# Patient Record
Sex: FEMALE | Race: WHITE | NOT HISPANIC OR LATINO | URBAN - METROPOLITAN AREA
[De-identification: names, ages, dates, MRNs, and addresses within clinical notes are randomized per-mention and may not be internally consistent; named-entity substitution may affect disease eponyms.]

---

## 2018-06-19 ENCOUNTER — OUTPATIENT (OUTPATIENT)
Dept: OUTPATIENT SERVICES | Facility: HOSPITAL | Age: 58
LOS: 1 days | End: 2018-06-19
Payer: COMMERCIAL

## 2018-06-19 VITALS
DIASTOLIC BLOOD PRESSURE: 70 MMHG | SYSTOLIC BLOOD PRESSURE: 122 MMHG | WEIGHT: 175.93 LBS | OXYGEN SATURATION: 98 % | HEIGHT: 60 IN | HEART RATE: 80 BPM | RESPIRATION RATE: 16 BRPM | TEMPERATURE: 99 F

## 2018-06-19 DIAGNOSIS — N84.0 POLYP OF CORPUS UTERI: ICD-10-CM

## 2018-06-19 LAB
ALBUMIN SERPL ELPH-MCNC: 4.2 G/DL — SIGNIFICANT CHANGE UP (ref 3.3–5)
ALP SERPL-CCNC: 95 U/L — SIGNIFICANT CHANGE UP (ref 40–120)
ALT FLD-CCNC: 20 U/L — SIGNIFICANT CHANGE UP (ref 4–33)
AST SERPL-CCNC: 17 U/L — SIGNIFICANT CHANGE UP (ref 4–32)
BILIRUB SERPL-MCNC: < 0.2 MG/DL — LOW (ref 0.2–1.2)
BLD GP AB SCN SERPL QL: NEGATIVE — SIGNIFICANT CHANGE UP
BUN SERPL-MCNC: 20 MG/DL — SIGNIFICANT CHANGE UP (ref 7–23)
CALCIUM SERPL-MCNC: 9.5 MG/DL — SIGNIFICANT CHANGE UP (ref 8.4–10.5)
CHLORIDE SERPL-SCNC: 100 MMOL/L — SIGNIFICANT CHANGE UP (ref 98–107)
CO2 SERPL-SCNC: 28 MMOL/L — SIGNIFICANT CHANGE UP (ref 22–31)
CREAT SERPL-MCNC: 0.71 MG/DL — SIGNIFICANT CHANGE UP (ref 0.5–1.3)
GLUCOSE SERPL-MCNC: 99 MG/DL — SIGNIFICANT CHANGE UP (ref 70–99)
HCT VFR BLD CALC: 42.8 % — SIGNIFICANT CHANGE UP (ref 34.5–45)
HGB BLD-MCNC: 13.4 G/DL — SIGNIFICANT CHANGE UP (ref 11.5–15.5)
MCHC RBC-ENTMCNC: 27 PG — SIGNIFICANT CHANGE UP (ref 27–34)
MCHC RBC-ENTMCNC: 31.3 % — LOW (ref 32–36)
MCV RBC AUTO: 86.3 FL — SIGNIFICANT CHANGE UP (ref 80–100)
NRBC # FLD: 0 — SIGNIFICANT CHANGE UP
PLATELET # BLD AUTO: 232 K/UL — SIGNIFICANT CHANGE UP (ref 150–400)
PMV BLD: 11.3 FL — SIGNIFICANT CHANGE UP (ref 7–13)
POTASSIUM SERPL-MCNC: 4.2 MMOL/L — SIGNIFICANT CHANGE UP (ref 3.5–5.3)
POTASSIUM SERPL-SCNC: 4.2 MMOL/L — SIGNIFICANT CHANGE UP (ref 3.5–5.3)
PROT SERPL-MCNC: 7.6 G/DL — SIGNIFICANT CHANGE UP (ref 6–8.3)
RBC # BLD: 4.96 M/UL — SIGNIFICANT CHANGE UP (ref 3.8–5.2)
RBC # FLD: 13.2 % — SIGNIFICANT CHANGE UP (ref 10.3–14.5)
RH IG SCN BLD-IMP: POSITIVE — SIGNIFICANT CHANGE UP
SODIUM SERPL-SCNC: 140 MMOL/L — SIGNIFICANT CHANGE UP (ref 135–145)
WBC # BLD: 11.09 K/UL — HIGH (ref 3.8–10.5)
WBC # FLD AUTO: 11.09 K/UL — HIGH (ref 3.8–10.5)

## 2018-06-19 PROCEDURE — 93010 ELECTROCARDIOGRAM REPORT: CPT

## 2018-06-19 NOTE — H&P PST ADULT - NEGATIVE ENMT SYMPTOMS
no nasal discharge/no sinus symptoms/no throat pain/no dysphagia/no hearing difficulty/no nasal congestion

## 2018-06-19 NOTE — H&P PST ADULT - NEGATIVE GENERAL GENITOURINARY SYMPTOMS
no dysuria/no bladder infections/no flank pain L/no flank pain R/normal urinary frequency/no renal colic

## 2018-06-19 NOTE — H&P PST ADULT - HISTORY OF PRESENT ILLNESS
57 y.o. female , LMP 2017, reports uterine polyp noted during routing GYN exam, denies pelvic pain, abnormal bleeding, presents to PST for evaluation for Hysteroscopy With Symphion Resection on 18

## 2018-06-19 NOTE — H&P PST ADULT - PROBLEM SELECTOR PLAN 1
Pt scheduled for Hysteroscopy with Symphion Resectoscopy on 06/27/18  Preop instructions provided. Pt verbalized understanding.   pt to take Nexium for GI prophylaxis

## 2018-06-19 NOTE — H&P PST ADULT - DENTITION
implants right upper x3, left upper x 3; bottom right x 3; frontal bottom x 1 multiple upper and lower dental implants, denies loose teeth

## 2018-06-19 NOTE — H&P PST ADULT - NEGATIVE MUSCULOSKELETAL SYMPTOMS
no myalgia/no muscle cramps/no neck pain/no back pain/no stiffness/no joint swelling/no muscle weakness/no arthritis

## 2018-06-19 NOTE — H&P PST ADULT - CONSTITUTIONAL DETAILS
well-groomed/no distress/well-developed/well-nourished well-nourished/well-developed/no distress/well-groomed/obese

## 2018-06-26 ENCOUNTER — TRANSCRIPTION ENCOUNTER (OUTPATIENT)
Age: 58
End: 2018-06-26

## 2018-06-27 ENCOUNTER — OUTPATIENT (OUTPATIENT)
Dept: OUTPATIENT SERVICES | Facility: HOSPITAL | Age: 58
LOS: 1 days | Discharge: HOME | End: 2018-06-27
Payer: COMMERCIAL

## 2018-06-27 ENCOUNTER — RESULT REVIEW (OUTPATIENT)
Age: 58
End: 2018-06-27

## 2018-06-27 VITALS
TEMPERATURE: 98 F | WEIGHT: 175.93 LBS | SYSTOLIC BLOOD PRESSURE: 133 MMHG | HEART RATE: 73 BPM | DIASTOLIC BLOOD PRESSURE: 60 MMHG | OXYGEN SATURATION: 96 % | HEIGHT: 60 IN | RESPIRATION RATE: 16 BRPM

## 2018-06-27 VITALS
DIASTOLIC BLOOD PRESSURE: 62 MMHG | SYSTOLIC BLOOD PRESSURE: 116 MMHG | RESPIRATION RATE: 16 BRPM | HEART RATE: 75 BPM | TEMPERATURE: 98 F | OXYGEN SATURATION: 95 %

## 2018-06-27 DIAGNOSIS — N84.0 POLYP OF CORPUS UTERI: ICD-10-CM

## 2018-06-27 PROCEDURE — 88305 TISSUE EXAM BY PATHOLOGIST: CPT | Mod: 26

## 2018-06-27 RX ORDER — ESOMEPRAZOLE MAGNESIUM 40 MG/1
1 CAPSULE, DELAYED RELEASE ORAL
Qty: 0 | Refills: 0 | COMMUNITY

## 2018-06-27 RX ORDER — IBUPROFEN 200 MG
3 TABLET ORAL
Qty: 0 | Refills: 0 | COMMUNITY

## 2018-06-27 RX ORDER — SODIUM CHLORIDE 9 MG/ML
1000 INJECTION, SOLUTION INTRAVENOUS
Qty: 0 | Refills: 0 | Status: DISCONTINUED | OUTPATIENT
Start: 2018-06-27 | End: 2018-06-28

## 2018-06-27 NOTE — ASU DISCHARGE PLAN (ADULT/PEDIATRIC). - NURSING INSTRUCTIONS
See medication reconciliation record   You were given Toradol for pain management. Please DO Not take Motrin/Ibuprofen (NSAIDS) for the next 6 hours (Until _8:30pm__).   You were given IV Tylenol for pain management.  Please DO NOT take tylenol for the next 6-8 hours (after 9pm ). Please do not exceed 3000mg in 24hours.

## 2018-06-27 NOTE — ASU DISCHARGE PLAN (ADULT/PEDIATRIC). - ACTIVITY LEVEL
no douching/no intercourse/no tampons/nothing per vagina/no tub baths no exercise/no sports/gym/no tampons/no heavy lifting/no douching/no intercourse/nothing per vagina/no tub baths

## 2018-06-27 NOTE — BRIEF OPERATIVE NOTE - PROCEDURE
<<-----Click on this checkbox to enter Procedure Polypectomy, hysteroscopic, uterus, with dilation and curettage  06/27/2018    Active  TLAL  Dilation and curettage  06/27/2018    Active  TLAL

## 2018-06-27 NOTE — ASU DISCHARGE PLAN (ADULT/PEDIATRIC). - COMMENTS
Surgical Unit will call you on the next business day to follow up. [facility] is open Monday - Friday.

## 2018-06-27 NOTE — ASU DISCHARGE PLAN (ADULT/PEDIATRIC). - NOTIFY
Bleeding that does not stop/GYN Fever>100.4/Inability to Tolerate Liquids or Foods/Pain not relieved by Medications/Increased Irritability or Sluggishness/Unable to Urinate/Persistent Nausea and Vomiting

## 2018-07-02 LAB — SURGICAL PATHOLOGY STUDY: SIGNIFICANT CHANGE UP

## 2021-01-08 PROBLEM — N84.0 POLYP OF CORPUS UTERI: Chronic | Status: ACTIVE | Noted: 2018-06-19

## 2021-01-08 PROBLEM — B15.9 HEPATITIS A WITHOUT HEPATIC COMA: Chronic | Status: ACTIVE | Noted: 2018-06-19

## 2021-01-08 PROBLEM — K21.9 GASTRO-ESOPHAGEAL REFLUX DISEASE WITHOUT ESOPHAGITIS: Chronic | Status: ACTIVE | Noted: 2018-06-19

## 2021-01-12 PROBLEM — Z00.00 ENCOUNTER FOR PREVENTIVE HEALTH EXAMINATION: Status: ACTIVE | Noted: 2021-01-12

## 2021-02-25 ENCOUNTER — APPOINTMENT (OUTPATIENT)
Dept: UROLOGY | Facility: CLINIC | Age: 61
End: 2021-02-25

## 2021-04-27 ENCOUNTER — APPOINTMENT (OUTPATIENT)
Age: 61
End: 2021-04-27
Payer: COMMERCIAL

## 2021-04-27 PROCEDURE — 0012A: CPT

## 2022-07-14 NOTE — ASU PATIENT PROFILE, ADULT - PT NEEDS ASSIST
Anesthesia Type: 1% lidocaine with 1:200,000 epinephrine and a 1:10 solution of 8.4% sodium bicarbonate no

## 2023-11-01 NOTE — ASU DISCHARGE PLAN (ADULT/PEDIATRIC). - MEDICATION SUMMARY - MEDICATIONS TO STOP TAKING
Heart Failure I will STOP taking the medications listed below when I get home from the hospital:  None

## 2024-01-01 NOTE — ASU PATIENT PROFILE, ADULT - URINARY CATHETER
no
Posture, length, shape, position symmetric and appropriate for age/Movement patterns with normal strength and range of motion/Hips without evidence of dislocation on Torres & Ortalani maneuvers and by gluteal fold patterns

## 2024-10-11 ENCOUNTER — TRANSCRIPTION ENCOUNTER (OUTPATIENT)
Age: 64
End: 2024-10-11

## 2024-10-11 ENCOUNTER — OUTPATIENT (OUTPATIENT)
Dept: OUTPATIENT SERVICES | Facility: HOSPITAL | Age: 64
LOS: 1 days | End: 2024-10-11
Payer: COMMERCIAL

## 2024-10-11 VITALS
OXYGEN SATURATION: 100 % | SYSTOLIC BLOOD PRESSURE: 124 MMHG | RESPIRATION RATE: 20 BRPM | WEIGHT: 153 LBS | HEART RATE: 89 BPM | HEIGHT: 62 IN | DIASTOLIC BLOOD PRESSURE: 71 MMHG | TEMPERATURE: 97 F

## 2024-10-11 VITALS
OXYGEN SATURATION: 99 % | SYSTOLIC BLOOD PRESSURE: 118 MMHG | RESPIRATION RATE: 16 BRPM | HEART RATE: 70 BPM | DIASTOLIC BLOOD PRESSURE: 61 MMHG

## 2024-10-11 DIAGNOSIS — C50.412 MALIGNANT NEOPLASM OF UPPER-OUTER QUADRANT OF LEFT FEMALE BREAST: ICD-10-CM

## 2024-10-11 PROCEDURE — 76937 US GUIDE VASCULAR ACCESS: CPT | Mod: 26

## 2024-10-11 PROCEDURE — 77001 FLUOROGUIDE FOR VEIN DEVICE: CPT

## 2024-10-11 PROCEDURE — 36561 INSERT TUNNELED CV CATH: CPT | Mod: RT

## 2024-10-11 PROCEDURE — C1894: CPT

## 2024-10-11 PROCEDURE — C1769: CPT

## 2024-10-11 PROCEDURE — 77001 FLUOROGUIDE FOR VEIN DEVICE: CPT | Mod: 26

## 2024-10-11 PROCEDURE — 36561 INSERT TUNNELED CV CATH: CPT

## 2024-10-11 PROCEDURE — C1788: CPT

## 2024-10-11 PROCEDURE — 76937 US GUIDE VASCULAR ACCESS: CPT

## 2024-10-11 RX ORDER — ONDANSETRON HCL/PF 4 MG/2 ML
4 VIAL (ML) INJECTION ONCE
Refills: 0 | Status: ACTIVE | OUTPATIENT
Start: 2024-10-11 | End: 2025-09-09

## 2024-10-11 NOTE — H&P ADULT - ASSESSMENT
64y Female with PMH of left sided breast CA presents for port placement.     Plan on port placement today.

## 2024-10-11 NOTE — ASU PATIENT PROFILE, ADULT - FALL HARM RISK - UNIVERSAL INTERVENTIONS
Bed in lowest position, wheels locked, appropriate side rails in place/Call bell, personal items and telephone in reach/Instruct patient to call for assistance before getting out of bed or chair/Non-slip footwear when patient is out of bed/[place] to call system/Physically safe environment - no spills, clutter or unnecessary equipment/Purposeful Proactive Rounding/Room/bathroom lighting operational, light cord in reach

## 2024-10-11 NOTE — H&P ADULT - NSHPPHYSICALEXAM_GEN_ALL_CORE
Gen: NAD  Neuro: AOx4  Chest: breathing room air comfortably  Abd: soft non distended  Ext: warm and well perfused

## 2024-10-11 NOTE — ASU PATIENT PROFILE, ADULT - NSICDXPASTMEDICALHX_GEN_ALL_CORE_FT
PAST MEDICAL HISTORY:  Acid reflux     Hepatitis A @ 22 y.o., resolved    Polyp of corpus uteri

## 2024-10-11 NOTE — H&P ADULT - HISTORY OF PRESENT ILLNESS
Pre-Interventional Radiology Procedure Note    JOSE ANTONIO APONTE | [id number]    10-11-24 @ 11:22    Interventional Radiology Attending Physician:     Ordering Attending Physician: Alex White    Diagnosis/Indication: Patient is a 64y old  Female who presents with a chief complaint of breast CA    Procedure: Port placement    64y Female with PMH of left sided breast CA presents for port placement.     PAST MEDICAL & SURGICAL HISTORY:  Acid reflux      Polyp of corpus uteri      Hepatitis A  @ 22 y.o., resolved      No significant past surgical history

## 2024-10-11 NOTE — ASU DISCHARGE PLAN (ADULT/PEDIATRIC) - NS MD DC FALL RISK RISK
For information on Fall & Injury Prevention, visit: [email or web address] OR  [email or web address] OR  https://www.cdc.gov/steadi/patient.html

## 2024-10-11 NOTE — PRE-ANESTHESIA EVALUATION ADULT - NSANTHOSAYNRD_GEN_A_CORE
No. MANUELA screening performed.  STOP BANG Legend: 0-2 = LOW Risk; 3-4 = INTERMEDIATE Risk; 5-8 = HIGH Risk

## 2025-04-29 ENCOUNTER — OUTPATIENT (OUTPATIENT)
Dept: OUTPATIENT SERVICES | Facility: HOSPITAL | Age: 65
LOS: 1 days | End: 2025-04-29
Payer: COMMERCIAL

## 2025-04-29 ENCOUNTER — TRANSCRIPTION ENCOUNTER (OUTPATIENT)
Age: 65
End: 2025-04-29

## 2025-04-29 ENCOUNTER — RESULT REVIEW (OUTPATIENT)
Age: 65
End: 2025-04-29

## 2025-04-29 VITALS
DIASTOLIC BLOOD PRESSURE: 59 MMHG | RESPIRATION RATE: 16 BRPM | SYSTOLIC BLOOD PRESSURE: 118 MMHG | HEART RATE: 72 BPM | OXYGEN SATURATION: 99 %

## 2025-04-29 VITALS
SYSTOLIC BLOOD PRESSURE: 152 MMHG | RESPIRATION RATE: 18 BRPM | OXYGEN SATURATION: 97 % | DIASTOLIC BLOOD PRESSURE: 70 MMHG | TEMPERATURE: 98 F | HEIGHT: 60 IN | HEART RATE: 70 BPM | WEIGHT: 156.97 LBS

## 2025-04-29 DIAGNOSIS — C50.412 MALIGNANT NEOPLASM OF UPPER-OUTER QUADRANT OF LEFT FEMALE BREAST: ICD-10-CM

## 2025-04-29 DIAGNOSIS — Z45.2 ENCOUNTER FOR ADJUSTMENT AND MANAGEMENT OF VASCULAR ACCESS DEVICE: ICD-10-CM

## 2025-04-29 DIAGNOSIS — Z85.3 PERSONAL HISTORY OF MALIGNANT NEOPLASM OF BREAST: ICD-10-CM

## 2025-04-29 PROCEDURE — 36590 REMOVAL TUNNELED CV CATH: CPT

## 2025-04-29 RX ORDER — ANASTROZOLE 1 MG/1
1 TABLET ORAL
Refills: 0 | DISCHARGE

## 2025-04-29 RX ORDER — OXYCODONE HYDROCHLORIDE 30 MG/1
5 TABLET ORAL ONCE
Refills: 0 | Status: DISCONTINUED | OUTPATIENT
Start: 2025-04-29 | End: 2025-04-29

## 2025-04-29 RX ORDER — ONDANSETRON HCL/PF 4 MG/2 ML
4 VIAL (ML) INJECTION ONCE
Refills: 0 | Status: DISCONTINUED | OUTPATIENT
Start: 2025-04-29 | End: 2025-05-13

## 2025-04-29 RX ORDER — FENTANYL CITRATE-0.9 % NACL/PF 100MCG/2ML
25 SYRINGE (ML) INTRAVENOUS
Refills: 0 | Status: DISCONTINUED | OUTPATIENT
Start: 2025-04-29 | End: 2025-04-29

## 2025-04-29 RX ORDER — ACETAMINOPHEN 500 MG/5ML
1000 LIQUID (ML) ORAL ONCE
Refills: 0 | Status: DISCONTINUED | OUTPATIENT
Start: 2025-04-29 | End: 2025-05-13

## 2025-04-29 NOTE — ASU DISCHARGE PLAN (ADULT/PEDIATRIC) - ASU DC SPECIAL INSTRUCTIONSFT
Chest Port Removal    Discharge Instructions  - You have had a chest port removed from your chest.   - You may shower in 48 hours. No soaking or swimming for 2 weeks or until the site is completely healed.  - Keep the area covered and dry for the next 7 days.  - Do not perform any heavy lifting or put tension on the area for the next week or until the site is healed.  - Do not remove steri-strips/skin glue. It will fall off in the next 2-3 weeks.  - You may resume your normal diet.  - You may resume your normal medications however you should wait 48 hours before restarting aspirin, plavix, or blood thinners.  - It is normal to experience some pain over the site for the next few days. You may take apply ice to the area (20 minutes on, 20 minutes off) and take Tylenol for that pain. Do not take more frequently than every 6 hours and do not exceed more than 3000mg of Tylenol in a 24 hour period.    - You were given conscious sedation which may make you drowsy, therefore you need someone to stay with you until the morning following the procedure.  - Do not drive, engage in heavy lifting or strenuous activity, or drink any alcoholic beverages for the next 24 hours.   - You may resume normal activity in 24 hours.    Notify your primary physician and/or Interventional Radiology IMMEDIATELY if you experience any of the following       - Fever of 100.5F       - Chills or Rigors/ Shakes       - Swelling and/or Redness in the area around the port removal site       - Worsening Pain       - Blood soaked bandages or worsening bleeding       - Lightheadedness and/or dizziness upon standing       - Chest Pain/ Tightness       - Shortness of Breath       - Difficulty walking    If you have a problem that you believe requires IMMEDIATE attention, please go to your NEAREST Emergency Room. If you believe your problem can safely wait until you speak to a physician, please call Interventional Radiology for any concerns.    During Normal Weekday Business Hours- You can contact the Interventional Radiology department during normal business hours via telephone.  During Evenings and Weekends- If you need to contact Interventional Radiology during off hours, do so by calling the hospital and requesting to be connected to the Interventional Radiologist on call.

## 2025-04-29 NOTE — ASU DISCHARGE PLAN (ADULT/PEDIATRIC) - NURSING INSTRUCTIONS
Please feel free to contact us at [phone] if any problems arise. After 6PM, Monday through Friday, on weekends and on holidays, please call [phone] and ask for the radiology resident on call to be paged.

## 2025-04-29 NOTE — H&P ADULT - IN ACCORDANCE WITH [place] LAW, WE OFFER EVERY PATIENT A HEPATITIS C TEST. WOULD YOU LIKE TO BE TESTED TODAY?
Patient called and left message on voicemail that he wanted to speak with provider BROOK Wylie rearding his back pain that he had some simple questions. Patient was scheduled on 08.18.2023 and was a no show for that appointment with [facility]

## 2025-04-29 NOTE — PROCEDURE NOTE - PROCEDURE FINDINGS AND DETAILS
Successful removal of R IJ chest wall port with 3 parts removed confirmed by visual inspection and fluoroscopy. No complications.

## 2025-04-29 NOTE — H&P ADULT - HISTORY OF PRESENT ILLNESS
Interventional Radiology    HPI: 64y Female with hx of breast ca s/p port placement 10/2024 presents for port removal.    Review of Systems:   Constitutional: No fever, weight loss, or fatigue  Neurological: No headaches, memory loss, loss of strength, numbness, or tremors  Respiratory: No cough, wheezing, chills or hemoptysis; No shortness of breath  Cardiovascular: No chest pain, palpitations, dizziness, or leg swelling  Gastrointestinal: No abdominal or epigastric pain. No nausea, vomiting, or hematemesis; No diarrhea or constipation. No melena or hematochezia.      Allergies: No Known Allergies    Medications (Abx/Cardiac/Anticoagulation/Blood Products)      Data:  152.4  71.2  T(C): 36.9  HR: 70  BP: 152/70  RR: 18  SpO2: 97%      Physical Exam  General: No acute distress, nontoxic, A&Ox3  Chest: Non labored breathing  Abdomen: Non-distended, non-tender, no preitoneal signs  Extremities: Warm    RADIOLOGY & ADDITIONAL TESTS:    Imaging Reviewed    Plan: 64y Female with hx of breast ca s/p port placement 10/2024 presents for port removal.  -Risks/Benefits/alternatives explained with the patient and/or healthcare proxy and witnessed informed consent obtained.

## 2025-04-29 NOTE — ASU DISCHARGE PLAN (ADULT/PEDIATRIC) - FINANCIAL ASSISTANCE
[facility] provides services at a reduced cost to those who are determined to be eligible through [facility]’s financial assistance program. Information regarding [facility]’s financial assistance program can be found by going to [email or web address] or by calling [phone].

## 2025-04-29 NOTE — ASU PREOP CHECKLIST - PATIENT'S PERSONAL PROPERTY GIVEN TO
MARIA ESTHER has been working all day with attempting to get pt place for rehab. MARIA ESTHER found [facility] and started working with Admission, Keren. MARIA ESTHER also was in contact with Dr. Thurston in regards to pt case and he provided needed updates and information to the Pt's medical chart. Pt was reportedly not accepted due to, per [employer] [employer]  change in his condition. He could not be admitted from home he would have to go to the ER. [facility] talked with Dr. Thurston who called Keren at [facility] and per Keren will be calling [employer].   security/safe